# Patient Record
Sex: FEMALE | Race: WHITE | URBAN - METROPOLITAN AREA
[De-identification: names, ages, dates, MRNs, and addresses within clinical notes are randomized per-mention and may not be internally consistent; named-entity substitution may affect disease eponyms.]

---

## 2021-01-22 ENCOUNTER — NEW PATIENT (OUTPATIENT)
Dept: URBAN - METROPOLITAN AREA CLINIC 30 | Facility: CLINIC | Age: 76
End: 2021-01-22
Payer: MEDICARE

## 2021-01-22 PROCEDURE — 92004 COMPRE OPH EXAM NEW PT 1/>: CPT | Performed by: OPTOMETRIST

## 2021-01-22 PROCEDURE — 92133 CPTRZD OPH DX IMG PST SGM ON: CPT | Performed by: OPTOMETRIST

## 2021-01-22 PROCEDURE — 92134 CPTRZ OPH DX IMG PST SGM RTA: CPT | Performed by: OPTOMETRIST

## 2021-01-22 ASSESSMENT — INTRAOCULAR PRESSURE
OD: 14
OS: 14

## 2021-01-22 ASSESSMENT — KERATOMETRY
OS: 45.75
OD: 45.15

## 2021-01-22 ASSESSMENT — VISUAL ACUITY
OS: 20/40
OD: 20/40

## 2021-02-04 ENCOUNTER — FOLLOW UP ESTABLISHED (OUTPATIENT)
Dept: URBAN - METROPOLITAN AREA CLINIC 30 | Facility: CLINIC | Age: 76
End: 2021-02-04
Payer: MEDICARE

## 2021-02-04 PROCEDURE — 92083 EXTENDED VISUAL FIELD XM: CPT | Performed by: OPTOMETRIST

## 2021-02-04 PROCEDURE — 99213 OFFICE O/P EST LOW 20 MIN: CPT | Performed by: OPTOMETRIST

## 2021-02-04 PROCEDURE — 76514 ECHO EXAM OF EYE THICKNESS: CPT | Performed by: OPTOMETRIST

## 2021-02-04 ASSESSMENT — INTRAOCULAR PRESSURE
OD: 15
OS: 14

## 2021-03-25 ENCOUNTER — NEW PATIENT (OUTPATIENT)
Dept: URBAN - METROPOLITAN AREA CLINIC 24 | Facility: CLINIC | Age: 76
End: 2021-03-25
Payer: MEDICARE

## 2021-03-25 DIAGNOSIS — H40.023 OPEN ANGLE WITH BORDERLINE FINDINGS, HIGH RISK, BILATERAL: ICD-10-CM

## 2021-03-25 DIAGNOSIS — H02.055 TRICHIASIS WITHOUT ENTROPION, LEFT LOWER LID: ICD-10-CM

## 2021-03-25 DIAGNOSIS — H18.513 ENDOTHELIAL CORNEAL DYSTROPHY, BILATERAL: Primary | ICD-10-CM

## 2021-03-25 PROCEDURE — 67820 REVISE EYELASHES: CPT | Performed by: OPHTHALMOLOGY

## 2021-03-25 PROCEDURE — 99204 OFFICE O/P NEW MOD 45 MIN: CPT | Performed by: OPHTHALMOLOGY

## 2021-03-25 RX ORDER — PREDNISOLONE ACETATE 10 MG/ML
1 % SUSPENSION/ DROPS OPHTHALMIC
Qty: 5 | Refills: 3 | Status: INACTIVE
Start: 2021-03-25 | End: 2021-06-13

## 2021-03-25 RX ORDER — OFLOXACIN 3 MG/ML
0.3 % SOLUTION/ DROPS OPHTHALMIC
Qty: 5 | Refills: 1 | Status: INACTIVE
Start: 2021-03-25 | End: 2021-06-01

## 2021-03-25 RX ORDER — KETOROLAC TROMETHAMINE 5 MG/ML
0.5 % SOLUTION OPHTHALMIC
Qty: 10 | Refills: 2 | Status: INACTIVE
Start: 2021-03-25 | End: 2021-07-29

## 2021-03-25 ASSESSMENT — INTRAOCULAR PRESSURE
OD: 15
OS: 15
OD: 15
OS: 15

## 2021-03-30 ENCOUNTER — ADULT PHYSICAL (OUTPATIENT)
Dept: URBAN - METROPOLITAN AREA CLINIC 30 | Facility: CLINIC | Age: 76
End: 2021-03-30
Payer: MEDICARE

## 2021-03-30 DIAGNOSIS — H25.813 COMBINED FORMS OF AGE-RELATED CATARACT, BILATERAL: ICD-10-CM

## 2021-03-30 PROCEDURE — 99203 OFFICE O/P NEW LOW 30 MIN: CPT | Performed by: PHYSICIAN ASSISTANT

## 2021-04-08 ENCOUNTER — SURGERY (OUTPATIENT)
Dept: URBAN - METROPOLITAN AREA SURGERY 12 | Facility: SURGERY | Age: 76
End: 2021-04-08
Payer: MEDICARE

## 2021-04-08 PROCEDURE — 65756 CORNEAL TRNSPL ENDOTHELIAL: CPT | Performed by: OPHTHALMOLOGY

## 2021-04-09 ENCOUNTER — POST-OPERATIVE VISIT (OUTPATIENT)
Dept: URBAN - METROPOLITAN AREA CLINIC 30 | Facility: CLINIC | Age: 76
End: 2021-04-09
Payer: MEDICARE

## 2021-04-09 DIAGNOSIS — Z48.810 ENCOUNTER FOR SURGICAL AFTERCARE FOLLOWING SURGERY ON A SENSE ORGAN: Primary | ICD-10-CM

## 2021-04-09 PROCEDURE — 99024 POSTOP FOLLOW-UP VISIT: CPT | Performed by: OPTOMETRIST

## 2021-04-09 ASSESSMENT — INTRAOCULAR PRESSURE
OD: 18
OS: 16

## 2021-04-09 NOTE — IMPRESSION/PLAN
Impression: S/P DMEK (Descemet Membrane Endothelial Keratoplasty; Cataract Extraction by phacoemulsification with IOL placement; Peripheral iridectomy OD - 1 Day. Encounter for surgical aftercare following surgery on a sense organ  Z48.810.  Plan: reviewed positioning instructions and importance, reviewed PO drops
see Dr. Tushar Johnson next week as scheduled

## 2021-04-16 ENCOUNTER — OFFICE VISIT (OUTPATIENT)
Dept: URBAN - METROPOLITAN AREA CLINIC 10 | Facility: CLINIC | Age: 76
End: 2021-04-16
Payer: MEDICARE

## 2021-04-16 PROCEDURE — 99024 POSTOP FOLLOW-UP VISIT: CPT | Performed by: OPHTHALMOLOGY

## 2021-04-16 ASSESSMENT — INTRAOCULAR PRESSURE
OD: 16
OS: 15

## 2021-04-16 NOTE — IMPRESSION/PLAN
Impression: Corneal transplant status: Z94.7.
- s/p phaco-DMEK OD 4/8/21 Plan: POW#1, doing well, Graft attached 360. IOP adequate. Precautions reviewed, Cont Pred and ATs QID, d/c Oflox. RTC for suture removal, IOP check. 
May schedule 2nd eye phaco-DMEK

## 2021-05-06 ENCOUNTER — POST-OPERATIVE VISIT (OUTPATIENT)
Dept: URBAN - METROPOLITAN AREA CLINIC 24 | Facility: CLINIC | Age: 76
End: 2021-05-06
Payer: MEDICARE

## 2021-05-06 PROCEDURE — 99024 POSTOP FOLLOW-UP VISIT: CPT | Performed by: OPHTHALMOLOGY

## 2021-05-06 ASSESSMENT — VISUAL ACUITY: OD: 20/25

## 2021-05-06 ASSESSMENT — INTRAOCULAR PRESSURE
OS: 14
OD: 14

## 2021-05-06 NOTE — IMPRESSION/PLAN
Impression: Corneal transplant status: Z94.7.
- s/p phaco-DMEK OD 4/8/21 Plan: POW#4 OD, doing well, graft C/C. Sutures removed today, oflox gtt placed. Start oflox TID x 3 d then d/c. Continue PF QID until _6_/_8_/__21__, then decrease PF to TID OD, d/c Ketorolac. ATs prn. Precautions reviewed.

## 2021-05-11 ENCOUNTER — ADULT PHYSICAL (OUTPATIENT)
Dept: URBAN - METROPOLITAN AREA CLINIC 30 | Facility: CLINIC | Age: 76
End: 2021-05-11
Payer: MEDICARE

## 2021-05-11 DIAGNOSIS — Z01.818 ENCOUNTER FOR OTHER PREPROCEDURAL EXAMINATION: Primary | ICD-10-CM

## 2021-05-11 PROCEDURE — 99213 OFFICE O/P EST LOW 20 MIN: CPT | Performed by: PHYSICIAN ASSISTANT

## 2021-05-20 ENCOUNTER — SURGERY (OUTPATIENT)
Dept: URBAN - METROPOLITAN AREA SURGERY 12 | Facility: SURGERY | Age: 76
End: 2021-05-20
Payer: MEDICARE

## 2021-05-20 PROCEDURE — 65756 CORNEAL TRNSPL ENDOTHELIAL: CPT | Performed by: OPHTHALMOLOGY

## 2021-05-21 ENCOUNTER — POST-OPERATIVE VISIT (OUTPATIENT)
Dept: URBAN - METROPOLITAN AREA CLINIC 30 | Facility: CLINIC | Age: 76
End: 2021-05-21
Payer: MEDICARE

## 2021-05-21 PROCEDURE — 99024 POSTOP FOLLOW-UP VISIT: CPT | Performed by: OPTOMETRIST

## 2021-05-21 ASSESSMENT — INTRAOCULAR PRESSURE: OS: 14

## 2021-05-21 NOTE — IMPRESSION/PLAN
Impression: S/P DMEK (Descemet Membrane Endothelial Keratoplasty; Cataract Extraction by phacoemulsification with IOL placement; Peripheral iridectomy OD - 43 Days. Encounter for surgical aftercare following surgery on a sense organ  Z48.810. Plan: Trichiasis LLL, lash epilated in office today c forceps. Patient would like to consider other options due to recurring nature. Schedule consult c oculoplastics prn.

## 2021-06-01 ENCOUNTER — OFFICE VISIT (OUTPATIENT)
Dept: URBAN - METROPOLITAN AREA CLINIC 10 | Facility: CLINIC | Age: 76
End: 2021-06-01
Payer: MEDICARE

## 2021-06-01 DIAGNOSIS — T86.8412 CORNEAL TRANSPLANT FAILURE, LEFT EYE: Primary | ICD-10-CM

## 2021-06-01 PROCEDURE — 99024 POSTOP FOLLOW-UP VISIT: CPT | Performed by: OPHTHALMOLOGY

## 2021-06-01 PROCEDURE — 66020 INJECTION TREATMENT OF EYE: CPT | Performed by: OPHTHALMOLOGY

## 2021-06-01 RX ORDER — OFLOXACIN 3 MG/ML
0.3 % SOLUTION/ DROPS OPHTHALMIC
Qty: 5 | Refills: 1 | Status: INACTIVE
Start: 2021-06-01 | End: 2021-07-29

## 2021-06-01 ASSESSMENT — INTRAOCULAR PRESSURE
OD: 18
OS: 18

## 2021-06-01 NOTE — IMPRESSION/PLAN
Impression: Corneal transplant failure, left eye: X58.7875.
- s/p phaco-DMEK OS 5/20/21 Plan: POW#1, Graft partially detached. Graft rebubbled under sterile conditions, oflox gtts placed, IOP adequate. Precautions reviewed, Resume supine positioning per schedule. Cont Pred, Oflox and ATs QID. Ketorolac weekly taper. RTC 5-7 days.

## 2021-06-01 NOTE — IMPRESSION/PLAN
Impression: Corneal transplant status: Z94.7.
- s/p phaco-DMEK OD 4/8/21 Plan: POW#7 OD, doing well, graft C/C. Continue PF QID until _6_/_8_/__21__, then decrease PF to TID OD,   ATs prn. Precautions reviewed.

## 2021-06-07 ENCOUNTER — POST-OPERATIVE VISIT (OUTPATIENT)
Dept: URBAN - METROPOLITAN AREA CLINIC 10 | Facility: CLINIC | Age: 76
End: 2021-06-07

## 2021-06-07 PROCEDURE — 99024 POSTOP FOLLOW-UP VISIT: CPT | Performed by: OPHTHALMOLOGY

## 2021-06-07 ASSESSMENT — INTRAOCULAR PRESSURE
OD: 14
OS: 14

## 2021-06-07 NOTE — IMPRESSION/PLAN
Impression: Corneal transplant status: Z94.7.
- s/p phaco-DMEK OD 4/8/21, OS 5/20/21 Plan: POM#2 OD, doing well, graft C/C. Continue PF QID until _6_/_8_/__21__, then decrease PF to TID OD,   ATs prn. Precautions reviewed. POW#2.5 OS, doing well, graft C/C. Sutures removed today, oflox gtt placed. Start oflox TID x 3 d then d/c. Continue PF QID until _7_/_20_/_21___, then decrease PF to TID OD, d/c Ketorolac. ATs prn. Precautions reviewed.

## 2021-07-29 ENCOUNTER — POST-OPERATIVE VISIT (OUTPATIENT)
Dept: URBAN - METROPOLITAN AREA CLINIC 24 | Facility: CLINIC | Age: 76
End: 2021-07-29
Payer: MEDICARE

## 2021-07-29 PROCEDURE — 99024 POSTOP FOLLOW-UP VISIT: CPT | Performed by: OPHTHALMOLOGY

## 2021-07-29 PROCEDURE — 76514 ECHO EXAM OF EYE THICKNESS: CPT | Performed by: OPHTHALMOLOGY

## 2021-07-29 RX ORDER — PREDNISOLONE ACETATE 10 MG/ML
1 % SUSPENSION/ DROPS OPHTHALMIC
Qty: 5 | Refills: 10 | Status: ACTIVE
Start: 2021-07-29

## 2021-07-29 ASSESSMENT — INTRAOCULAR PRESSURE
OD: 14
OS: 15

## 2021-07-29 ASSESSMENT — VISUAL ACUITY
OD: 20/30
OS: 20/30

## 2021-09-09 ENCOUNTER — OFFICE VISIT (OUTPATIENT)
Dept: URBAN - METROPOLITAN AREA CLINIC 26 | Facility: CLINIC | Age: 76
End: 2021-09-09
Payer: MEDICARE

## 2021-09-09 DIAGNOSIS — Z94.7 CORNEAL TRANSPLANT STATUS: ICD-10-CM

## 2021-09-09 DIAGNOSIS — H26.492 OTHER SECONDARY CATARACT, LEFT EYE: Primary | ICD-10-CM

## 2021-09-09 PROCEDURE — 99214 OFFICE O/P EST MOD 30 MIN: CPT | Performed by: OPHTHALMOLOGY

## 2021-09-09 ASSESSMENT — VISUAL ACUITY
OS: 20/25
OD: 20/25

## 2021-09-09 NOTE — IMPRESSION/PLAN
Impression: Corneal transplant status: Z94.7.
- s/p phaco-DMEK OD 4/8/21, OS 5/20/21 Plan: POM#5 OD, doing well, graft C/C. Continue PF QD OD until 4/8/22 then d/c. Precautions reviewed. POM#3.5 OS, doing well, graft C/C. Continue PF BID until _9_/_20_/_21___, then decrease PF to QD OS until 5/20/21 then d/c. Precautions reviewed.

## 2021-09-09 NOTE — IMPRESSION/PLAN
Impression: Other secondary cataract, left eye: H26.492. Plan: Opacified capsule with option for YAG laser capsulotomy. Discussed procedure, risks, benefits and side effects. Patient agrees to YAG laser capsulotomy.  Schedule YAG laser OS with Dr. Tosha Recinos

## 2021-09-14 ENCOUNTER — ADULT PHYSICAL (OUTPATIENT)
Dept: URBAN - METROPOLITAN AREA CLINIC 24 | Facility: CLINIC | Age: 76
End: 2021-09-14
Payer: MEDICARE

## 2021-09-14 PROCEDURE — 99213 OFFICE O/P EST LOW 20 MIN: CPT | Performed by: PHYSICIAN ASSISTANT

## 2021-09-23 ENCOUNTER — SURGERY (OUTPATIENT)
Dept: URBAN - METROPOLITAN AREA SURGERY 12 | Facility: SURGERY | Age: 76
End: 2021-09-23
Payer: MEDICARE

## 2021-09-23 PROCEDURE — 66821 AFTER CATARACT LASER SURGERY: CPT | Performed by: OPHTHALMOLOGY

## 2021-10-14 ENCOUNTER — POST-OPERATIVE VISIT (OUTPATIENT)
Dept: URBAN - METROPOLITAN AREA CLINIC 30 | Facility: CLINIC | Age: 76
End: 2021-10-14
Payer: MEDICARE

## 2021-10-14 DIAGNOSIS — Z96.1 PRESENCE OF INTRAOCULAR LENS: Primary | ICD-10-CM

## 2021-10-14 PROCEDURE — 99024 POSTOP FOLLOW-UP VISIT: CPT | Performed by: OPTOMETRIST

## 2021-10-14 ASSESSMENT — INTRAOCULAR PRESSURE
OS: 17
OD: 17

## 2021-10-14 ASSESSMENT — VISUAL ACUITY
OD: 20/30
OS: 20/40

## 2021-10-14 ASSESSMENT — KERATOMETRY
OD: 44.76
OS: 45.80

## 2021-10-14 NOTE — IMPRESSION/PLAN
Impression: S/P YAG Capsulotomy (Yttrium Aluminum [place]) OS - 21 Days. Presence of intraocular lens  Z96.1. Plan: Patient deferred SRX. Continue PF 1% QD OU.  S/P DMEK OU

## 2022-01-27 ENCOUNTER — OFFICE VISIT (OUTPATIENT)
Dept: URBAN - METROPOLITAN AREA CLINIC 24 | Facility: CLINIC | Age: 77
End: 2022-01-27
Payer: MEDICARE

## 2022-01-27 DIAGNOSIS — H11.052 PERIPHERAL PTERYGIUM, PROGRESSIVE, LEFT EYE: ICD-10-CM

## 2022-01-27 PROCEDURE — 99213 OFFICE O/P EST LOW 20 MIN: CPT | Performed by: OPHTHALMOLOGY

## 2022-01-27 ASSESSMENT — INTRAOCULAR PRESSURE
OS: 10
OD: 13

## 2022-01-27 NOTE — IMPRESSION/PLAN
Impression: Corneal transplant status: Z94.7.
- s/p phaco-DMEK OD 4/8/21, OS 5/20/21 Plan: POM#9 OD, doing well, graft C/C. Continue PF QD OD until 4/8/22 then d/c. Precautions reviewed. POM#8 OS, doing well, graft C/C. Continue PF BID until _9_/_20_/_21___, then decrease PF to QD OS until 5/20/21 then d/c. Precautions reviewed. 

Given stability, may return to general clinic for monitoring, cornea prn

## 2022-07-27 ENCOUNTER — OFFICE VISIT (OUTPATIENT)
Dept: URBAN - METROPOLITAN AREA CLINIC 30 | Facility: CLINIC | Age: 77
End: 2022-07-27
Payer: MEDICARE

## 2022-07-27 DIAGNOSIS — H04.123 TEAR FILM INSUFFICIENCY OF BILATERAL LACRIMAL GLANDS: ICD-10-CM

## 2022-07-27 DIAGNOSIS — H11.052 PERIPHERAL PTERYGIUM, PROGRESSIVE, LEFT EYE: ICD-10-CM

## 2022-07-27 DIAGNOSIS — Z96.1 PRESENCE OF INTRAOCULAR LENS: ICD-10-CM

## 2022-07-27 DIAGNOSIS — H40.023 OPEN ANGLE WITH BORDERLINE FINDINGS, HIGH RISK, BILATERAL: Primary | ICD-10-CM

## 2022-07-27 DIAGNOSIS — Z94.7 CORNEAL TRANSPLANT STATUS: ICD-10-CM

## 2022-07-27 PROCEDURE — 92134 CPTRZ OPH DX IMG PST SGM RTA: CPT | Performed by: OPTOMETRIST

## 2022-07-27 PROCEDURE — 92133 CPTRZD OPH DX IMG PST SGM ON: CPT | Performed by: OPTOMETRIST

## 2022-07-27 PROCEDURE — 92014 COMPRE OPH EXAM EST PT 1/>: CPT | Performed by: OPTOMETRIST

## 2022-07-27 ASSESSMENT — VISUAL ACUITY
OD: 20/20
OS: 20/25

## 2022-07-27 ASSESSMENT — INTRAOCULAR PRESSURE
OS: 15
OD: 13

## 2022-07-27 ASSESSMENT — KERATOMETRY
OS: 45.65
OD: 44.85

## 2022-07-27 NOTE — IMPRESSION/PLAN
Impression: Tear film insufficiency of bilateral lacrimal glands: H04.123. Plan: Rec regular use of ATs BID-QID OU.

## 2022-07-27 NOTE — IMPRESSION/PLAN
Impression: Open angle with borderline findings, high risk, bilateral
FHx-possibly sister, unsure Plan: Due to cupping, though could be physiologic. IOPs WNL. RNFL 1/21 WNL OU, ave 90/88 RNFL 7/2022: (90,89) stable.  
PACHS and VF next visit

## 2022-07-27 NOTE — IMPRESSION/PLAN
Impression: Corneal transplant status: Z94.7.
- s/p phaco-DMEK OD 4/8/21, OS 5/20/21 Plan: Doing well s/p DMEK OU. Off drops now. Call if pain/redness/decreased vision occurs.

## 2022-12-01 ENCOUNTER — OFFICE VISIT (OUTPATIENT)
Dept: URBAN - METROPOLITAN AREA CLINIC 30 | Facility: CLINIC | Age: 77
End: 2022-12-01
Payer: MEDICARE

## 2022-12-01 DIAGNOSIS — H40.023 OPEN ANGLE WITH BORDERLINE FINDINGS, HIGH RISK, BILATERAL: Primary | ICD-10-CM

## 2022-12-01 PROCEDURE — 76514 ECHO EXAM OF EYE THICKNESS: CPT | Performed by: OPTOMETRIST

## 2022-12-01 PROCEDURE — 92083 EXTENDED VISUAL FIELD XM: CPT | Performed by: OPTOMETRIST

## 2022-12-01 PROCEDURE — 99213 OFFICE O/P EST LOW 20 MIN: CPT | Performed by: OPTOMETRIST

## 2022-12-01 ASSESSMENT — INTRAOCULAR PRESSURE
OD: 14
OS: 14

## 2022-12-01 NOTE — IMPRESSION/PLAN
Impression: Open angle with borderline findings, high risk, bilateral
FHx- possibly sister, unsure PACHS 11/2022: 535, 529 Plan: Due to cupping, likely physiologic. IOPs are stable, 14 OU s tx. RNFL 1/21 WNL OU, ave 90/88 RNFL 7/2022: (90,89) stable. VF 11/2022: essentially full OU, c scattered defects and high FP's OS. Continue to monitor s tx. Pt educ on findings.